# Patient Record
Sex: FEMALE
[De-identification: names, ages, dates, MRNs, and addresses within clinical notes are randomized per-mention and may not be internally consistent; named-entity substitution may affect disease eponyms.]

---

## 2018-09-26 ENCOUNTER — APPOINTMENT (OUTPATIENT)
Dept: UROGYNECOLOGY | Facility: CLINIC | Age: 53
End: 2018-09-26

## 2019-03-08 ENCOUNTER — APPOINTMENT (OUTPATIENT)
Dept: DERMATOLOGY | Facility: CLINIC | Age: 54
End: 2019-03-08

## 2019-07-02 ENCOUNTER — APPOINTMENT (OUTPATIENT)
Dept: DERMATOLOGY | Facility: CLINIC | Age: 54
End: 2019-07-02
Payer: COMMERCIAL

## 2019-07-02 VITALS — HEIGHT: 66 IN | WEIGHT: 138 LBS | BODY MASS INDEX: 22.18 KG/M2

## 2019-07-02 DIAGNOSIS — L98.8 OTHER SPECIFIED DISORDERS OF THE SKIN AND SUBCUTANEOUS TISSUE: ICD-10-CM

## 2019-07-02 DIAGNOSIS — L57.0 ACTINIC KERATOSIS: ICD-10-CM

## 2019-07-02 PROCEDURE — 99203 OFFICE O/P NEW LOW 30 MIN: CPT | Mod: 25

## 2019-07-02 PROCEDURE — 11102 TANGNTL BX SKIN SINGLE LES: CPT | Mod: 59

## 2019-07-02 PROCEDURE — 17110 DESTRUCTION B9 LES UP TO 14: CPT

## 2019-07-02 PROCEDURE — 17000 DESTRUCT PREMALG LESION: CPT | Mod: 59

## 2019-07-15 LAB — CORE LAB BIOPSY: NORMAL

## 2019-07-22 ENCOUNTER — APPOINTMENT (OUTPATIENT)
Dept: DERMATOLOGY | Facility: CLINIC | Age: 54
End: 2019-07-22
Payer: COMMERCIAL

## 2019-07-22 DIAGNOSIS — C44.619 BASAL CELL CARCINOMA OF SKIN OF LEFT UPPER LIMB, INCLUDING SHOULDER: ICD-10-CM

## 2019-07-22 DIAGNOSIS — Z85.828 PERSONAL HISTORY OF OTHER MALIGNANT NEOPLASM OF SKIN: ICD-10-CM

## 2019-07-22 PROCEDURE — 17262 DSTRJ MAL LES T/A/L 1.1-2.0: CPT

## 2019-07-26 PROBLEM — L57.0 ACTINIC KERATOSIS: Status: ACTIVE | Noted: 2019-07-26

## 2019-07-26 NOTE — ASSESSMENT
[FreeTextEntry1] : The patient was instructed to check portal and/or call the office in one week for biopsy results.\par Plan to treat by D&C if the biopsy is positive. \par \par LN2 to wart;  may need multiple txs;

## 2019-07-26 NOTE — PHYSICAL EXAM
[Full Body Skin Exam Performed] : performed [FreeTextEntry3] : Skin examination performed of the face, neck, trunk, arms, legs; \par The patient is well, alert and oriented, pleasant and cooperative.\par Eyelids, conjunctivae, oral mucosa, digits and nails all normal.  \par No cervical adenopathy.\par \par Normal findings include:\par \par Seborrheic keratoses\par Angiomas\par + lentigines and solar damage are present in sun exposed areas; \par keratotic papule with black dots on surface; thick;  R pinky tip\par \par scaling pearly erythematous patch L forearm\par \par

## 2019-07-26 NOTE — HISTORY OF PRESENT ILLNESS
[de-identified] : Pt. presents for skin check;\par No itching, bleeding, growing, changing lesions noted;\par Severity:  mild  \par Modifying factors:  none\par Associated symptoms:  none\par Context:  no association with activity \par c/o wart on finger, has had multiple cryo treatments; \par

## 2019-09-16 ENCOUNTER — APPOINTMENT (OUTPATIENT)
Dept: OBGYN | Facility: CLINIC | Age: 54
End: 2019-09-16
Payer: COMMERCIAL

## 2019-09-16 VITALS
DIASTOLIC BLOOD PRESSURE: 78 MMHG | SYSTOLIC BLOOD PRESSURE: 138 MMHG | HEIGHT: 66 IN | BODY MASS INDEX: 22.18 KG/M2 | WEIGHT: 138 LBS | RESPIRATION RATE: 16 BRPM | TEMPERATURE: 98.6 F

## 2019-09-16 PROCEDURE — 99203 OFFICE O/P NEW LOW 30 MIN: CPT | Mod: 57

## 2019-09-16 PROCEDURE — 99386 PREV VISIT NEW AGE 40-64: CPT | Mod: 25

## 2019-09-16 NOTE — PHYSICAL EXAM
[Awake] : awake [Alert] : alert [Acute Distress] : no acute distress [Mass] : no breast mass [Nipple Discharge] : no nipple discharge [Axillary LAD] : no axillary lymphadenopathy [Soft] : soft [Tender] : non tender [Oriented x3] : oriented to person, place, and time [Normal] : cervix [Cystocele] : a cystocele [Rectocele] : a rectocele [Uterine Prolapse] : uterine prolapse [No Bleeding] : there was no active vaginal bleeding [Uterine Adnexae] : were not tender and not enlarged

## 2019-09-16 NOTE — CHIEF COMPLAINT
[FreeTextEntry1] : PT FOR ANNUAL VISIT. DISCUSSED MENOPAUSE AND HOT FLASHES AND VAGINAL PRESSURE AND PROLAPSE

## 2020-01-24 ENCOUNTER — APPOINTMENT (OUTPATIENT)
Dept: DERMATOLOGY | Facility: CLINIC | Age: 55
End: 2020-01-24

## 2020-12-12 ENCOUNTER — TRANSCRIPTION ENCOUNTER (OUTPATIENT)
Age: 55
End: 2020-12-12

## 2020-12-12 ENCOUNTER — OUTPATIENT (OUTPATIENT)
Dept: OUTPATIENT SERVICES | Facility: HOSPITAL | Age: 55
LOS: 1 days | End: 2020-12-12
Payer: COMMERCIAL

## 2020-12-12 DIAGNOSIS — Z11.59 ENCOUNTER FOR SCREENING FOR OTHER VIRAL DISEASES: ICD-10-CM

## 2020-12-12 PROCEDURE — U0003: CPT

## 2020-12-13 DIAGNOSIS — Z11.59 ENCOUNTER FOR SCREENING FOR OTHER VIRAL DISEASES: ICD-10-CM

## 2020-12-13 LAB — SARS-COV-2 RNA SPEC QL NAA+PROBE: SIGNIFICANT CHANGE UP

## 2020-12-26 ENCOUNTER — OUTPATIENT (OUTPATIENT)
Dept: OUTPATIENT SERVICES | Facility: HOSPITAL | Age: 55
LOS: 1 days | End: 2020-12-26
Payer: COMMERCIAL

## 2020-12-26 DIAGNOSIS — Z20.828 CONTACT WITH AND (SUSPECTED) EXPOSURE TO OTHER VIRAL COMMUNICABLE DISEASES: ICD-10-CM

## 2020-12-26 PROCEDURE — C9803: CPT

## 2020-12-26 PROCEDURE — U0003: CPT

## 2020-12-27 DIAGNOSIS — Z20.828 CONTACT WITH AND (SUSPECTED) EXPOSURE TO OTHER VIRAL COMMUNICABLE DISEASES: ICD-10-CM

## 2020-12-27 LAB — SARS-COV-2 RNA SPEC QL NAA+PROBE: SIGNIFICANT CHANGE UP

## 2021-02-05 ENCOUNTER — APPOINTMENT (OUTPATIENT)
Dept: INTERNAL MEDICINE | Facility: CLINIC | Age: 56
End: 2021-02-05
Payer: COMMERCIAL

## 2021-02-05 ENCOUNTER — NON-APPOINTMENT (OUTPATIENT)
Age: 56
End: 2021-02-05

## 2021-02-05 VITALS
HEART RATE: 70 BPM | HEIGHT: 66 IN | TEMPERATURE: 97.1 F | RESPIRATION RATE: 16 BRPM | WEIGHT: 144 LBS | OXYGEN SATURATION: 98 % | DIASTOLIC BLOOD PRESSURE: 80 MMHG | SYSTOLIC BLOOD PRESSURE: 140 MMHG | BODY MASS INDEX: 23.14 KG/M2

## 2021-02-05 DIAGNOSIS — Q18.8 OTHER SPECIFIED CONGENITAL MALFORMATIONS OF FACE AND NECK: ICD-10-CM

## 2021-02-05 DIAGNOSIS — R92.2 INCONCLUSIVE MAMMOGRAM: ICD-10-CM

## 2021-02-05 PROCEDURE — 99072 ADDL SUPL MATRL&STAF TM PHE: CPT

## 2021-02-05 PROCEDURE — 99386 PREV VISIT NEW AGE 40-64: CPT

## 2021-02-05 NOTE — ASSESSMENT
[FreeTextEntry1] : 1.Right shoulder pain: obtain shoulder xray, c/w otc pain meds/heat therapy and may need PT vs ortho referral. \par \par 2. Hx of High TSH: on levothyroxine in the past but caused palpitations so stopped medication. no sx of hypothyroidism at this time. Check TFTs. \par \par 3.High BP: goal <130/80, Check blood pressure daily, if greater than 150/90, call MD. Keep 2 gm low sodium diet, exercise as tolerated.\par f/u in 1 month. \par \par 4. HM: Discussed fasting blood work to get.\par given referral for mammogram/breast US. Patient counseled regarding seat belt safety, distracted driving & diet and exercise.\par \par

## 2021-02-05 NOTE — HISTORY OF PRESENT ILLNESS
[FreeTextEntry1] : Patient comes in to establish care.\par  [de-identified] : Patient comes in for an annual physical exam.\par Pt is a 54 y/o F comes in to establish care, previous PCP: , Gyn: . \par She has had on/off shoulder pain b/l for many months. no injury or trauma. Feels more pain especially at night when sleepin manfred shoulders. \par Patient denies any cp, sob,abdominal pain, nausea, vomiting, palpitations, fever, chills, constipation, diarrhea.\par

## 2021-02-05 NOTE — HEALTH RISK ASSESSMENT
[0] : 2) Feeling down, depressed, or hopeless: Not at all (0) [Yes] : Yes [Monthly or less (1 pt)] : Monthly or less (1 point) [1 or 2 (0 pts)] : 1 or 2 (0 points) [Never (0 pts)] : Never (0 points) [No] : In the past 12 months have you used drugs other than those required for medical reasons? No [Patient reported mammogram was normal] : Patient reported mammogram was normal [Patient reported colonoscopy was normal] : Patient reported colonoscopy was normal [With Family] : lives with family [] :  [Fully functional (bathing, dressing, toileting, transferring, walking, feeding)] : Fully functional (bathing, dressing, toileting, transferring, walking, feeding) [Fully functional (using the telephone, shopping, preparing meals, housekeeping, doing laundry, using] : Fully functional and needs no help or supervision to perform IADLs (using the telephone, shopping, preparing meals, housekeeping, doing laundry, using transportation, managing medications and managing finances) [] : No [[id number]] : 0 [MammogramDate] : 10/19 [ColonoscopyDate] : 2021

## 2021-02-17 ENCOUNTER — APPOINTMENT (OUTPATIENT)
Dept: RADIOLOGY | Facility: CLINIC | Age: 56
End: 2021-02-17
Payer: COMMERCIAL

## 2021-02-17 ENCOUNTER — OUTPATIENT (OUTPATIENT)
Dept: OUTPATIENT SERVICES | Facility: HOSPITAL | Age: 56
LOS: 1 days | End: 2021-02-17
Payer: COMMERCIAL

## 2021-02-17 DIAGNOSIS — M25.511 PAIN IN RIGHT SHOULDER: ICD-10-CM

## 2021-02-17 PROCEDURE — 73030 X-RAY EXAM OF SHOULDER: CPT

## 2021-02-17 PROCEDURE — 73030 X-RAY EXAM OF SHOULDER: CPT | Mod: 26,RT

## 2021-02-21 ENCOUNTER — OUTPATIENT (OUTPATIENT)
Dept: OUTPATIENT SERVICES | Facility: HOSPITAL | Age: 56
LOS: 1 days | End: 2021-02-21
Payer: COMMERCIAL

## 2021-02-21 DIAGNOSIS — Z20.828 CONTACT WITH AND (SUSPECTED) EXPOSURE TO OTHER VIRAL COMMUNICABLE DISEASES: ICD-10-CM

## 2021-02-21 LAB — SARS-COV-2 RNA SPEC QL NAA+PROBE: SIGNIFICANT CHANGE UP

## 2021-02-21 PROCEDURE — C9803: CPT

## 2021-02-21 PROCEDURE — U0005: CPT

## 2021-02-21 PROCEDURE — U0003: CPT

## 2021-02-22 DIAGNOSIS — Z20.828 CONTACT WITH AND (SUSPECTED) EXPOSURE TO OTHER VIRAL COMMUNICABLE DISEASES: ICD-10-CM

## 2021-02-22 LAB
25(OH)D3 SERPL-MCNC: 23.1 NG/ML
ALBUMIN SERPL ELPH-MCNC: 4.6 G/DL
ALP BLD-CCNC: 96 U/L
ALT SERPL-CCNC: 14 U/L
ANION GAP SERPL CALC-SCNC: 12 MMOL/L
AST SERPL-CCNC: 20 U/L
BASOPHILS # BLD AUTO: 0.04 K/UL
BASOPHILS NFR BLD AUTO: 0.7 %
BILIRUB SERPL-MCNC: 0.3 MG/DL
BUN SERPL-MCNC: 13 MG/DL
CALCIUM SERPL-MCNC: 10 MG/DL
CHLORIDE SERPL-SCNC: 103 MMOL/L
CHOLEST SERPL-MCNC: 220 MG/DL
CO2 SERPL-SCNC: 26 MMOL/L
CREAT SERPL-MCNC: 0.84 MG/DL
EOSINOPHIL # BLD AUTO: 0.28 K/UL
EOSINOPHIL NFR BLD AUTO: 5.2 %
ESTIMATED AVERAGE GLUCOSE: 114 MG/DL
GLUCOSE SERPL-MCNC: 88 MG/DL
HBA1C MFR BLD HPLC: 5.6 %
HCT VFR BLD CALC: 44.4 %
HDLC SERPL-MCNC: 60 MG/DL
HGB BLD-MCNC: 13.9 G/DL
IMM GRANULOCYTES NFR BLD AUTO: 0.4 %
LDLC SERPL CALC-MCNC: 136 MG/DL
LYMPHOCYTES # BLD AUTO: 1.69 K/UL
LYMPHOCYTES NFR BLD AUTO: 31.5 %
MAN DIFF?: NORMAL
MCHC RBC-ENTMCNC: 27.6 PG
MCHC RBC-ENTMCNC: 31.3 GM/DL
MCV RBC AUTO: 88.1 FL
MONOCYTES # BLD AUTO: 0.38 K/UL
MONOCYTES NFR BLD AUTO: 7.1 %
NEUTROPHILS # BLD AUTO: 2.96 K/UL
NEUTROPHILS NFR BLD AUTO: 55.1 %
NONHDLC SERPL-MCNC: 159 MG/DL
PLATELET # BLD AUTO: 221 K/UL
POTASSIUM SERPL-SCNC: 4.3 MMOL/L
PROT SERPL-MCNC: 7.1 G/DL
RBC # BLD: 5.04 M/UL
RBC # FLD: 14 %
SARS-COV-2 IGG SERPL IA-ACNC: 0.06 INDEX
SARS-COV-2 IGG SERPL QL IA: NEGATIVE
SODIUM SERPL-SCNC: 142 MMOL/L
T4 FREE SERPL-MCNC: 0.8 NG/DL
TRIGL SERPL-MCNC: 118 MG/DL
TSH SERPL-ACNC: 8.34 UIU/ML
VIT B12 SERPL-MCNC: 543 PG/ML
WBC # FLD AUTO: 5.37 K/UL

## 2021-02-23 ENCOUNTER — NON-APPOINTMENT (OUTPATIENT)
Age: 56
End: 2021-02-23

## 2021-03-02 ENCOUNTER — RESULT REVIEW (OUTPATIENT)
Age: 56
End: 2021-03-02

## 2021-03-02 ENCOUNTER — APPOINTMENT (OUTPATIENT)
Dept: MAMMOGRAPHY | Facility: CLINIC | Age: 56
End: 2021-03-02
Payer: COMMERCIAL

## 2021-03-02 ENCOUNTER — OUTPATIENT (OUTPATIENT)
Dept: OUTPATIENT SERVICES | Facility: HOSPITAL | Age: 56
LOS: 1 days | End: 2021-03-02
Payer: COMMERCIAL

## 2021-03-02 ENCOUNTER — APPOINTMENT (OUTPATIENT)
Dept: ULTRASOUND IMAGING | Facility: CLINIC | Age: 56
End: 2021-03-02
Payer: COMMERCIAL

## 2021-03-02 DIAGNOSIS — Z00.8 ENCOUNTER FOR OTHER GENERAL EXAMINATION: ICD-10-CM

## 2021-03-02 DIAGNOSIS — R92.2 INCONCLUSIVE MAMMOGRAM: ICD-10-CM

## 2021-03-02 PROCEDURE — 76641 ULTRASOUND BREAST COMPLETE: CPT

## 2021-03-02 PROCEDURE — 77063 BREAST TOMOSYNTHESIS BI: CPT | Mod: 26

## 2021-03-02 PROCEDURE — 77067 SCR MAMMO BI INCL CAD: CPT

## 2021-03-02 PROCEDURE — 76641 ULTRASOUND BREAST COMPLETE: CPT | Mod: 26,50

## 2021-03-02 PROCEDURE — 77063 BREAST TOMOSYNTHESIS BI: CPT

## 2021-03-02 PROCEDURE — 77067 SCR MAMMO BI INCL CAD: CPT | Mod: 26

## 2021-03-03 ENCOUNTER — NON-APPOINTMENT (OUTPATIENT)
Age: 56
End: 2021-03-03

## 2021-03-09 ENCOUNTER — APPOINTMENT (OUTPATIENT)
Dept: INTERNAL MEDICINE | Facility: CLINIC | Age: 56
End: 2021-03-09

## 2021-05-03 ENCOUNTER — LABORATORY RESULT (OUTPATIENT)
Age: 56
End: 2021-05-03

## 2021-05-10 LAB — TSH SERPL-ACNC: 4.82 UIU/ML

## 2021-05-17 ENCOUNTER — APPOINTMENT (OUTPATIENT)
Dept: OBGYN | Facility: CLINIC | Age: 56
End: 2021-05-17

## 2021-05-24 ENCOUNTER — NON-APPOINTMENT (OUTPATIENT)
Age: 56
End: 2021-05-24

## 2021-05-24 ENCOUNTER — APPOINTMENT (OUTPATIENT)
Dept: OBGYN | Facility: CLINIC | Age: 56
End: 2021-05-24
Payer: COMMERCIAL

## 2021-05-24 VITALS
DIASTOLIC BLOOD PRESSURE: 60 MMHG | HEART RATE: 78 BPM | SYSTOLIC BLOOD PRESSURE: 139 MMHG | TEMPERATURE: 97.6 F | RESPIRATION RATE: 16 BRPM | BODY MASS INDEX: 23.3 KG/M2 | HEIGHT: 66 IN | WEIGHT: 145 LBS

## 2021-05-24 DIAGNOSIS — Z01.419 ENCOUNTER FOR GYNECOLOGICAL EXAMINATION (GENERAL) (ROUTINE) W/OUT ABNORMAL FINDINGS: ICD-10-CM

## 2021-05-24 PROCEDURE — 99396 PREV VISIT EST AGE 40-64: CPT

## 2021-05-24 PROCEDURE — 82270 OCCULT BLOOD FECES: CPT

## 2021-05-24 NOTE — PLAN
[FreeTextEntry1] : PT PRESENTS FOR ANNUAL VISIT. DISCUSSED PROLAPSE. DISCUSSED OPTIONS. INFORMATION GIVEN. DISCUSSED SURGICAL OPTIONS. MAMMO ORDERED. PAP ORDERED

## 2021-05-24 NOTE — PHYSICAL EXAM
[Appropriately responsive] : appropriately responsive [Alert] : alert [No Acute Distress] : no acute distress [No Lymphadenopathy] : no lymphadenopathy [Regular Rate Rhythm] : regular rate rhythm [No Murmurs] : no murmurs [Clear to Auscultation B/L] : clear to auscultation bilaterally [Soft] : soft [Non-tender] : non-tender [Non-distended] : non-distended [No HSM] : No HSM [No Lesions] : no lesions [No Mass] : no mass [Oriented x3] : oriented x3 [Examination Of The Breasts] : a normal appearance [No Masses] : no breast masses were palpable [Cystocele] : a cystocele [Uterine Prolapse] : uterine prolapse [Normal rectal exam] : was normal [Normal Brown Stool] : was normal and brown [Occult Blood Positive] : was negative for occult blood analysis [Internal Hemorrhoid] : no internal hemorrhoids were present [External Hemorrhoid] : no external hemorrhoids were present [Skin Tags] : no residual hemorrhoidal skin tags [Normal] : was normal [None] : there was no rectal mass

## 2021-05-24 NOTE — COUNSELING
[Nutrition/ Exercise/ Weight Management] : nutrition, exercise, weight management [Body Image] : body image [Breast Self Exam] : breast self exam [Vaccines] : vaccines [FreeTextEntry2] : UTEROVAGINAL PROLAPSE

## 2021-08-09 ENCOUNTER — APPOINTMENT (OUTPATIENT)
Dept: DERMATOLOGY | Facility: CLINIC | Age: 56
End: 2021-08-09
Payer: COMMERCIAL

## 2021-08-09 PROCEDURE — 17110 DESTRUCTION B9 LES UP TO 14: CPT

## 2021-08-09 PROCEDURE — 99213 OFFICE O/P EST LOW 20 MIN: CPT | Mod: 25

## 2021-08-09 NOTE — ASSESSMENT
[FreeTextEntry1] : 1) benign findings as above- education\par \par 2) Verruca vulgaris-\par -Education\par -paired using 15 blade followed by LN2 x2 cycles; SED, undertstands risk of redness, blistering, scarring, and incomplete treatment\par

## 2021-08-09 NOTE — PHYSICAL EXAM
[FreeTextEntry3] : AAOx3, pleasant, NAD, no visual lymphadenopathy\par hair, scalp, face, nose, eyelids, ears, lips, oropharynx, neck, chest, abdomen, back, right arm, left arm, nails, and hands examined with all normal findings,\par pertinent findings include:\par \par lentigines\par keratotic papule with black dots on surface on right 5th digit

## 2021-08-09 NOTE — HISTORY OF PRESENT ILLNESS
[FreeTextEntry1] : spot [de-identified] : 55 year old female with spot on right 5th digit. hx of improvement.

## 2021-08-12 ENCOUNTER — NON-APPOINTMENT (OUTPATIENT)
Age: 56
End: 2021-08-12

## 2021-08-12 LAB — TSH SERPL-ACNC: 4.1 UIU/ML

## 2021-09-14 ENCOUNTER — APPOINTMENT (OUTPATIENT)
Dept: INTERNAL MEDICINE | Facility: CLINIC | Age: 56
End: 2021-09-14

## 2021-09-14 ENCOUNTER — APPOINTMENT (OUTPATIENT)
Dept: INTERNAL MEDICINE | Facility: CLINIC | Age: 56
End: 2021-09-14
Payer: COMMERCIAL

## 2021-09-14 VITALS
WEIGHT: 138 LBS | BODY MASS INDEX: 22.18 KG/M2 | SYSTOLIC BLOOD PRESSURE: 124 MMHG | DIASTOLIC BLOOD PRESSURE: 76 MMHG | HEART RATE: 68 BPM | HEIGHT: 66 IN | TEMPERATURE: 98.6 F | OXYGEN SATURATION: 99 %

## 2021-09-14 DIAGNOSIS — R21 RASH AND OTHER NONSPECIFIC SKIN ERUPTION: ICD-10-CM

## 2021-09-14 PROCEDURE — 99214 OFFICE O/P EST MOD 30 MIN: CPT

## 2021-09-14 NOTE — ASSESSMENT
[FreeTextEntry1] : 1.thigh rash: Likely fungal. Discussed using betamethasone clotrimazole to affected area twice daily. Discussed preventative measures. Next\par \par 2.right shoulder pain: Given referral to physical therapy prior to doing MRI.\par \par 3.dysphagia: Given referral to GI. She may need upper endoscopy.\par \par 4.hypothyroidism: Given referral to new endocrinologist, follow up with endocrine on thyroid ultrasound.

## 2021-09-14 NOTE — HISTORY OF PRESENT ILLNESS
[FreeTextEntry8] : Ms. DINA HOUSER is a 55 year F who comes in for an acute visit.\par Pt c/o rash on inner right thigh for the past few months and some of left thigh. The rash is red, itchy and aggravated with heat. She tx with coconut oil, Neosporin, alcohol wipe, otc HC and calamine lotion without much relief. \par Pt also notes continued right shoulder pain, she did have negative Xray in feb 2021 and did not end up doing PT due to covid. She notes its worse in the morning with stiffness. She has not had an MRI in the past. \par She also notes on off difficulty swallowing certain foods-with stuck sensation with meat, dry food but not with liquids. She did see endocrine yesterday and she will get a thyroid US. \par Patient denies any cp, sob,abdominal pain, nausea, vomiting, palpitations, fever, chills, constipation, diarrhea.\par

## 2021-11-11 ENCOUNTER — APPOINTMENT (OUTPATIENT)
Dept: GASTROENTEROLOGY | Facility: CLINIC | Age: 56
End: 2021-11-11
Payer: COMMERCIAL

## 2021-11-11 VITALS
BODY MASS INDEX: 22.34 KG/M2 | WEIGHT: 139 LBS | SYSTOLIC BLOOD PRESSURE: 120 MMHG | DIASTOLIC BLOOD PRESSURE: 70 MMHG | HEIGHT: 66 IN | HEART RATE: 95 BPM

## 2021-11-11 PROCEDURE — 99203 OFFICE O/P NEW LOW 30 MIN: CPT

## 2021-11-11 NOTE — ASSESSMENT
[FreeTextEntry1] : 55 yo female with history of dysphagia and ?Schatski's ring. Will arrange upper endoscopy and possible dilation.

## 2021-11-11 NOTE — HISTORY OF PRESENT ILLNESS
[de-identified] : Ms. DINA HOUSER is a 56 year old female with history of intermittent dysphagia for solids. There has been no weight loss or progressive symptoms. There are no complaints of heartburn. Symptoms have lasted for several years. There is no family history of stomach or esophageal cancer. No complaints of bowel issues.\par

## 2022-01-04 ENCOUNTER — APPOINTMENT (OUTPATIENT)
Dept: DERMATOLOGY | Facility: CLINIC | Age: 57
End: 2022-01-04
Payer: COMMERCIAL

## 2022-01-04 DIAGNOSIS — Z87.2 PERSONAL HISTORY OF DISEASES OF THE SKIN AND SUBCUTANEOUS TISSUE: ICD-10-CM

## 2022-01-04 DIAGNOSIS — L82.0 INFLAMED SEBORRHEIC KERATOSIS: ICD-10-CM

## 2022-01-04 DIAGNOSIS — L30.4 ERYTHEMA INTERTRIGO: ICD-10-CM

## 2022-01-04 DIAGNOSIS — L57.0 ACTINIC KERATOSIS: ICD-10-CM

## 2022-01-04 DIAGNOSIS — L81.4 OTHER MELANIN HYPERPIGMENTATION: ICD-10-CM

## 2022-01-04 DIAGNOSIS — D22.9 MELANOCYTIC NEVI, UNSPECIFIED: ICD-10-CM

## 2022-01-04 DIAGNOSIS — Z12.83 ENCOUNTER FOR SCREENING FOR MALIGNANT NEOPLASM OF SKIN: ICD-10-CM

## 2022-01-04 PROCEDURE — 99214 OFFICE O/P EST MOD 30 MIN: CPT | Mod: 25

## 2022-01-04 PROCEDURE — 17000 DESTRUCT PREMALG LESION: CPT | Mod: 59

## 2022-01-04 PROCEDURE — 17110 DESTRUCTION B9 LES UP TO 14: CPT | Mod: 59

## 2022-01-04 NOTE — ASSESSMENT
[FreeTextEntry1] : 1) benign findings as above- education\par \par 2) intertrigo\par education\par keep area dry\par keto cream PRN; SED\par \par 3) Actinic keratoses as above\par -Treated with cryotherapy x 2, side effects discussed including erythema and scarring, blister formation expected by patient, total freeze time 4 seconds. Wound care reviewed withpatient. Risk of hypo/hyperpigmentation reviewed with patient, and possible need for re-treatment and / or future biopsy also reviewed with patient\par - total # treated- 1\par \par 4) ISK\par The specified lesions were treated with liquid nitrogen cryotherapy.  Discussed risks including pain, blistering, crusting, discoloration, recurrence.\par

## 2022-01-04 NOTE — HISTORY OF PRESENT ILLNESS
[FreeTextEntry1] : skin check, rash [de-identified] : 56 year old female here for skin check. rash on thighs. \par growth on right cheek.

## 2022-01-04 NOTE — PHYSICAL EXAM
[FreeTextEntry3] : AAOx3, pleasant, NAD, no visual lymphadenopathy\par hair, scalp, face, nose, eyelids, ears, lips, oropharynx, neck, chest, abdomen, back, right arm, left arm, nails, and hands examined with all normal findings,\par pertinent findings include:\par \par multiple benign nevi and lentigines\par scaling erythematous papule on right cheek\par + inflamed, waxy, keratotic papule on mid back\par erythema on groin

## 2022-01-13 ENCOUNTER — TRANSCRIPTION ENCOUNTER (OUTPATIENT)
Age: 57
End: 2022-01-13

## 2022-01-21 ENCOUNTER — APPOINTMENT (OUTPATIENT)
Dept: DISASTER EMERGENCY | Facility: CLINIC | Age: 57
End: 2022-01-21

## 2022-01-21 ENCOUNTER — LABORATORY RESULT (OUTPATIENT)
Age: 57
End: 2022-01-21

## 2022-01-24 ENCOUNTER — TRANSCRIPTION ENCOUNTER (OUTPATIENT)
Age: 57
End: 2022-01-24

## 2022-01-25 ENCOUNTER — APPOINTMENT (OUTPATIENT)
Dept: GASTROENTEROLOGY | Facility: AMBULATORY MEDICAL SERVICES | Age: 57
End: 2022-01-25

## 2022-01-27 ENCOUNTER — RX RENEWAL (OUTPATIENT)
Age: 57
End: 2022-01-27

## 2022-03-16 ENCOUNTER — APPOINTMENT (OUTPATIENT)
Dept: INTERNAL MEDICINE | Facility: CLINIC | Age: 57
End: 2022-03-16

## 2022-05-03 ENCOUNTER — APPOINTMENT (OUTPATIENT)
Dept: GASTROENTEROLOGY | Facility: CLINIC | Age: 57
End: 2022-05-03
Payer: COMMERCIAL

## 2022-05-03 VITALS
DIASTOLIC BLOOD PRESSURE: 89 MMHG | HEIGHT: 66 IN | SYSTOLIC BLOOD PRESSURE: 152 MMHG | WEIGHT: 139 LBS | BODY MASS INDEX: 22.34 KG/M2 | HEART RATE: 70 BPM

## 2022-05-03 PROCEDURE — 99214 OFFICE O/P EST MOD 30 MIN: CPT

## 2022-05-03 NOTE — PHYSICAL EXAM
[General Appearance - Alert] : alert [General Appearance - In No Acute Distress] : in no acute distress [Sclera] : the sclera and conjunctiva were normal [PERRL With Normal Accommodation] : pupils were equal in size, round, and reactive to light [Extraocular Movements] : extraocular movements were intact [Outer Ear] : the ears and nose were normal in appearance [Oropharynx] : the oropharynx was normal [Neck Appearance] : the appearance of the neck was normal [Neck Cervical Mass (___cm)] : no neck mass was observed [Jugular Venous Distention Increased] : there was no jugular-venous distention [Thyroid Diffuse Enlargement] : the thyroid was not enlarged [Thyroid Nodule] : there were no palpable thyroid nodules [Auscultation Breath Sounds / Voice Sounds] : lungs were clear to auscultation bilaterally [Abnormal Walk] : normal gait [Nail Clubbing] : no clubbing  or cyanosis of the fingernails [Musculoskeletal - Swelling] : no joint swelling seen [Motor Tone] : muscle strength and tone were normal [Skin Color & Pigmentation] : normal skin color and pigmentation [Skin Turgor] : normal skin turgor [] : no rash

## 2022-05-03 NOTE — ASSESSMENT
[FreeTextEntry1] : Dysphagia will stop eating post a bedtime and schedule upper endoscopy\par Schedule screening colonoscopy with supra-

## 2022-05-03 NOTE — HISTORY OF PRESENT ILLNESS
[FreeTextEntry1] : It has been having long-standing difficulties. Several years worth of dysphagia to solids greater than liquids. She denies heartburn, but does have a snack close to that time almost every night. She denies melena. She denies significant nonsteroidal anti-inflammatory drug use or alcohol use. She denies any family history of Robertson's esophagus. She denies any asthma or allergic history she has never had a screening colonoscopy

## 2022-06-06 ENCOUNTER — APPOINTMENT (OUTPATIENT)
Dept: DERMATOLOGY | Facility: CLINIC | Age: 57
End: 2022-06-06
Payer: COMMERCIAL

## 2022-06-06 DIAGNOSIS — D48.9 NEOPLASM OF UNCERTAIN BEHAVIOR, UNSPECIFIED: ICD-10-CM

## 2022-06-06 PROCEDURE — 11102 TANGNTL BX SKIN SINGLE LES: CPT

## 2022-06-06 PROCEDURE — 99213 OFFICE O/P EST LOW 20 MIN: CPT | Mod: 25

## 2022-06-06 RX ORDER — SODIUM SULFATE, POTASSIUM SULFATE, MAGNESIUM SULFATE 17.5; 3.13; 1.6 G/ML; G/ML; G/ML
17.5-3.13-1.6 SOLUTION, CONCENTRATE ORAL
Qty: 1 | Refills: 0 | Status: DISCONTINUED | COMMUNITY
Start: 2022-05-03 | End: 2022-06-06

## 2022-06-06 NOTE — ASSESSMENT
[FreeTextEntry1] : Pt. with traumatic nail dystrophy 2' nail care; \par ? onycho beginning on R thumb; \par \par Therapeutic options and their risks and benefits; along with multiple diagnostic possibilities were discussed at length; risks and benefits of further study were discussed;\par \par nail clipped, sent for PAS:\par t/c terbinafine if + - otherwise should regrow normally; \par Continue regular exams; \par has wart on finger, t/c repeat cryo

## 2022-06-06 NOTE — HISTORY OF PRESENT ILLNESS
[de-identified] : Pt. c/o problems with nails; \par had gel manicure, removed, now with detached thin nails

## 2022-06-13 ENCOUNTER — APPOINTMENT (OUTPATIENT)
Dept: OBGYN | Facility: CLINIC | Age: 57
End: 2022-06-13
Payer: COMMERCIAL

## 2022-06-13 VITALS
BODY MASS INDEX: 22.11 KG/M2 | WEIGHT: 137 LBS | TEMPERATURE: 98 F | SYSTOLIC BLOOD PRESSURE: 140 MMHG | DIASTOLIC BLOOD PRESSURE: 82 MMHG

## 2022-06-13 DIAGNOSIS — Z12.39 ENCOUNTER FOR OTHER SCREENING FOR MALIGNANT NEOPLASM OF BREAST: ICD-10-CM

## 2022-06-13 DIAGNOSIS — Z01.419 ENCOUNTER FOR GYNECOLOGICAL EXAMINATION (GENERAL) (ROUTINE) W/OUT ABNORMAL FINDINGS: ICD-10-CM

## 2022-06-13 DIAGNOSIS — N81.2 INCOMPLETE UTEROVAGINAL PROLAPSE: ICD-10-CM

## 2022-06-13 PROCEDURE — 82270 OCCULT BLOOD FECES: CPT

## 2022-06-13 PROCEDURE — 99396 PREV VISIT EST AGE 40-64: CPT

## 2022-06-13 NOTE — PLAN
[FreeTextEntry1] : 57 YO FEMALE PRESENTS FOR ANNUAL GYN EXAMINATION. SELF BREAST EXAMINATION TAUGHT. MAMMOGRAM ORDERED. EXERCISE, CALCIUM AND VITAMIN D3 SUPPLEMENTATION DISCUSSED. BONE DENSITY STUDY AND INDICATIONS REVIEWED. COLONOSCOPY HISTORY REVIEWED AND DISCUSSED FOLLOWUP. DISCUSSED PROLAPSE AND SURGERY

## 2022-06-13 NOTE — COUNSELING
[Breast Self Exam] : breast self exam [Bladder Hygiene] : bladder hygiene [Vaccines] : vaccines [Influenza Vaccine] : influenza vaccine [FreeTextEntry2] : PROLAPSE

## 2022-06-15 ENCOUNTER — LABORATORY RESULT (OUTPATIENT)
Age: 57
End: 2022-06-15

## 2022-06-15 LAB — CYTOLOGY CVX/VAG DOC THIN PREP: ABNORMAL

## 2022-06-21 ENCOUNTER — APPOINTMENT (OUTPATIENT)
Dept: INTERNAL MEDICINE | Facility: CLINIC | Age: 57
End: 2022-06-21
Payer: COMMERCIAL

## 2022-06-21 VITALS
OXYGEN SATURATION: 98 % | BODY MASS INDEX: 21.86 KG/M2 | HEIGHT: 66 IN | WEIGHT: 136 LBS | DIASTOLIC BLOOD PRESSURE: 60 MMHG | HEART RATE: 65 BPM | TEMPERATURE: 98.2 F | SYSTOLIC BLOOD PRESSURE: 118 MMHG

## 2022-06-21 DIAGNOSIS — Z23 ENCOUNTER FOR IMMUNIZATION: ICD-10-CM

## 2022-06-21 DIAGNOSIS — M25.511 PAIN IN RIGHT SHOULDER: ICD-10-CM

## 2022-06-21 DIAGNOSIS — R79.89 OTHER SPECIFIED ABNORMAL FINDINGS OF BLOOD CHEMISTRY: ICD-10-CM

## 2022-06-21 LAB
ALBUMIN SERPL ELPH-MCNC: 4.6 G/DL
ALP BLD-CCNC: 82 U/L
ALT SERPL-CCNC: 11 U/L
ANION GAP SERPL CALC-SCNC: 17 MMOL/L
AST SERPL-CCNC: 19 U/L
BASOPHILS # BLD AUTO: 0.04 K/UL
BASOPHILS NFR BLD AUTO: 0.8 %
BILIRUB SERPL-MCNC: 0.3 MG/DL
BUN SERPL-MCNC: 12 MG/DL
CALCIUM SERPL-MCNC: 9.3 MG/DL
CHLORIDE SERPL-SCNC: 104 MMOL/L
CHOLEST SERPL-MCNC: 204 MG/DL
CO2 SERPL-SCNC: 22 MMOL/L
CREAT SERPL-MCNC: 0.74 MG/DL
EGFR: 95 ML/MIN/1.73M2
EOSINOPHIL # BLD AUTO: 0.18 K/UL
EOSINOPHIL NFR BLD AUTO: 3.8 %
GLUCOSE SERPL-MCNC: 82 MG/DL
HCT VFR BLD CALC: 41.4 %
HDLC SERPL-MCNC: 53 MG/DL
HGB BLD-MCNC: 13.2 G/DL
IMM GRANULOCYTES NFR BLD AUTO: 0.2 %
LDLC SERPL CALC-MCNC: 135 MG/DL
LYMPHOCYTES # BLD AUTO: 1.46 K/UL
LYMPHOCYTES NFR BLD AUTO: 30.9 %
MAN DIFF?: NORMAL
MCHC RBC-ENTMCNC: 28.2 PG
MCHC RBC-ENTMCNC: 31.9 GM/DL
MCV RBC AUTO: 88.5 FL
MONOCYTES # BLD AUTO: 0.36 K/UL
MONOCYTES NFR BLD AUTO: 7.6 %
NEUTROPHILS # BLD AUTO: 2.67 K/UL
NEUTROPHILS NFR BLD AUTO: 56.7 %
NONHDLC SERPL-MCNC: 151 MG/DL
PLATELET # BLD AUTO: 190 K/UL
POTASSIUM SERPL-SCNC: 4.1 MMOL/L
PROT SERPL-MCNC: 6.9 G/DL
RBC # BLD: 4.68 M/UL
RBC # FLD: 13.7 %
SODIUM SERPL-SCNC: 143 MMOL/L
TRIGL SERPL-MCNC: 82 MG/DL
TSH SERPL-ACNC: 5.65 UIU/ML
WBC # FLD AUTO: 4.72 K/UL

## 2022-06-21 PROCEDURE — 99213 OFFICE O/P EST LOW 20 MIN: CPT

## 2022-06-21 RX ORDER — CLOTRIMAZOLE AND BETAMETHASONE DIPROPIONATE 10; .5 MG/G; MG/G
1-0.05 CREAM TOPICAL TWICE DAILY
Qty: 1 | Refills: 1 | Status: DISCONTINUED | COMMUNITY
Start: 2021-09-14 | End: 2022-06-21

## 2022-06-22 ENCOUNTER — RESULT REVIEW (OUTPATIENT)
Age: 57
End: 2022-06-22

## 2022-06-22 ENCOUNTER — APPOINTMENT (OUTPATIENT)
Dept: ULTRASOUND IMAGING | Facility: CLINIC | Age: 57
End: 2022-06-22
Payer: COMMERCIAL

## 2022-06-22 ENCOUNTER — APPOINTMENT (OUTPATIENT)
Dept: MAMMOGRAPHY | Facility: CLINIC | Age: 57
End: 2022-06-22
Payer: COMMERCIAL

## 2022-06-22 ENCOUNTER — OUTPATIENT (OUTPATIENT)
Dept: OUTPATIENT SERVICES | Facility: HOSPITAL | Age: 57
LOS: 1 days | End: 2022-06-22
Payer: COMMERCIAL

## 2022-06-22 DIAGNOSIS — Z12.39 ENCOUNTER FOR OTHER SCREENING FOR MALIGNANT NEOPLASM OF BREAST: ICD-10-CM

## 2022-06-22 DIAGNOSIS — Z01.419 ENCOUNTER FOR GYNECOLOGICAL EXAMINATION (GENERAL) (ROUTINE) WITHOUT ABNORMAL FINDINGS: ICD-10-CM

## 2022-06-22 DIAGNOSIS — Z00.8 ENCOUNTER FOR OTHER GENERAL EXAMINATION: ICD-10-CM

## 2022-06-22 DIAGNOSIS — R92.2 INCONCLUSIVE MAMMOGRAM: ICD-10-CM

## 2022-06-22 PROCEDURE — 77067 SCR MAMMO BI INCL CAD: CPT | Mod: 26

## 2022-06-22 PROCEDURE — 77067 SCR MAMMO BI INCL CAD: CPT

## 2022-06-22 PROCEDURE — 77063 BREAST TOMOSYNTHESIS BI: CPT

## 2022-06-22 PROCEDURE — 76641 ULTRASOUND BREAST COMPLETE: CPT | Mod: 26,50

## 2022-06-22 PROCEDURE — 77063 BREAST TOMOSYNTHESIS BI: CPT | Mod: 26

## 2022-06-22 PROCEDURE — 76641 ULTRASOUND BREAST COMPLETE: CPT

## 2022-06-22 NOTE — ASSESSMENT
[FreeTextEntry1] : 1.Hypothyroidism: recheck TFT's in 6-8 weeks, if mildly high then will go up on thyroid dosing in small increments as pt has had SE of palpitations in the past. Records from  Endocrine viewed on pt's phone, obtain records. repeat thyoid us in 9/22.

## 2022-06-22 NOTE — HISTORY OF PRESENT ILLNESS
[FreeTextEntry1] : FU [de-identified] : DINA HOUSER is a 56 year F who is being seen today in follow up to discuss blood work results.\par BW doing well but TSH is mildly elevated today and pt notes she is compliant with her medications. \par She did see [facility] Endocrine Dr.Rachel Mi in 9/2021 and had thyroid US done which showed right nodule 0.3 x 0.3 x 0.3 cm nodule and left sided nodule, normal size thyroid with no suspicious nodules. She was advised to have TPO and Thyroglobulin Ab done. \par She denies any sx of weight gain, constipation, dry skin/hair with mild fatigue. \par Patient denies any cp, sob,abdominal pain, nausea, vomiting, palpitations, fever, chills, constipation, diarrhea.\par

## 2022-06-23 ENCOUNTER — TRANSCRIPTION ENCOUNTER (OUTPATIENT)
Age: 57
End: 2022-06-23

## 2022-07-12 ENCOUNTER — APPOINTMENT (OUTPATIENT)
Dept: ORTHOPEDIC SURGERY | Facility: CLINIC | Age: 57
End: 2022-07-12

## 2022-07-12 ENCOUNTER — NON-APPOINTMENT (OUTPATIENT)
Age: 57
End: 2022-07-12

## 2022-07-12 DIAGNOSIS — M75.81 OTHER SHOULDER LESIONS, RIGHT SHOULDER: ICD-10-CM

## 2022-07-12 DIAGNOSIS — M75.41 IMPINGEMENT SYNDROME OF RIGHT SHOULDER: ICD-10-CM

## 2022-07-12 DIAGNOSIS — M75.42 IMPINGEMENT SYNDROME OF RIGHT SHOULDER: ICD-10-CM

## 2022-07-12 DIAGNOSIS — M75.80 OTHER SHOULDER LESIONS, UNSPECIFIED SHOULDER: ICD-10-CM

## 2022-07-12 DIAGNOSIS — M75.20 BICIPITAL TENDINITIS, UNSPECIFIED SHOULDER: ICD-10-CM

## 2022-07-12 PROCEDURE — 99204 OFFICE O/P NEW MOD 45 MIN: CPT

## 2022-07-12 PROCEDURE — 73030 X-RAY EXAM OF SHOULDER: CPT | Mod: RT

## 2022-07-12 NOTE — HISTORY OF PRESENT ILLNESS
[Worsening] : worsening [___ yrs] : [unfilled] year(s) ago [3] : an average pain level of 3/10 [0] : a minimum pain level of 0/10 [4] : a maximum pain level of 4/10 [Lifting] : worsened by lifting [de-identified] : DINA HOUSER is a 56 year female being seen for initial visit R shoulder pain. 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 She reports worst pain at night, and finds it difficult to get comfortable. She states she has minimal pain during the day. She endorses general joint pain, she cannot pinpoint an exact location. Patient denies numbness and tingling to the extremities. She denies use of OTC pain medications. She has not done any formal PT. She denies prior injuries.

## 2022-07-12 NOTE — ADDENDUM
[FreeTextEntry1] : Documented by Stanislav Olson acting as a scribe for Dr. Viramontes and Christian Warren PA-C on 07/12/2022. \par \par All medical record entries made by the Scribe were at my, Dr. Viramontes, and Christian Warren's, direction and\par personally dictated by me on 07/12/2022. I have reviewed the chart and agree that the record\par accurately reflects my personal performance of the history, physical exam, procedure and imaging.

## 2022-07-12 NOTE — DISCUSSION/SUMMARY
[de-identified] : DINA HOUSER is a 56 year female being seen for initial visit R shoulder pain. 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 She reports worst pain at night, and finds it difficult to get comfortable. She states she has minimal pain during the day. She endorses general joint pain, she cannot pinpoint an exact location. Patient denies numbness and tingling to the extremities. She denies use of OTC pain medications. She has not done any formal PT. She denies prior injuries.\par \par We had a thorough discussion regarding the nature of her pain, the pathophysiology, as well as all treatment options. After review of pts normal radiographs and her clinical exam I believe her primary complaint to be RTC tendonitis of which conservative measures are indicated. A prescription of Meloxicam x 21 days was prescribed and should be taken as directed with food to prevent GI upset, if occurs pt to D/C and call us at that time. Pt to perform PT as well 2x/week x 6-8 weeks and until we see her again. At that time we will see her for clinical reassessment. If she is pain free at that time she will see us in the future on an prn basis. If she is refractory we will consider a cortisone injection. Pt agrees to the above plan and all questions were answered.

## 2022-07-12 NOTE — PHYSICAL EXAM
[de-identified] : Physical Exam:\par General: Well appearing, no acute distress\par Neurologic: A&Ox3, No focal deficits\par Head: NCAT without abrasions, lacerations, or ecchymosis to head, face, or scalp\par Eyes: No scleral icterus, no gross abnormalities\par Respiratory: Equal chest wall expansion bilaterally, no accessory muscle use\par Lymphatic: No lymphadenopathy palpated\par Skin: Warm and dry\par Psychiatric: Normal mood and affect \par \par R Shoulder\par \par No tenderness to palpation over R clavicle, AC joint, upper trap, biceps tendon, biceps belly, deltoid. \par PROM: full and painless shoulder flexion, shoulder ABD, shoulder extension, shoulder ER at 0, IR at 90, shoulder ADD, Right (R) sitting anteriorly. \par AROM: shoulder IR to 40 with pain, full and painless shoulder flexion, shoulder ABD, shoulder extension, shoulder ER at 0, shoulder ADD, shoulder retraction\par MMT: 5/5 shoulder flexion, 4+/5 shoulder ABD, 5/5 shoulder ER at 0, 5/5 shoulder IR at 0, 5/5 shoulder ADD, 5/5 shoulder retraction, 5/5 shoulder protraction\par ·	Tests: Coronel test positive Neer sign positive negative drop arm test secondary to pain, bear hug test positive Napolean sign positive cross arm adduction negative, lift off sign positive, hornblowers sign negative, speeds test negative, Yergason's test negative, no bicipital groove tenderness, Lindsay's Active Compression test mildly positive, negative sheer. \par \par Left Shoulder\par ·	Inspection/Palpation: no tenderness, swelling or deformities\par ·	Range of Motion: full and painless in all planes, no crepitus\par ·	Strength: forward elevation in scapular plane 5/5, internal rotation 5/5, external rotation 5/5, adduction 5/5 and abduction 5/5\par ·	Stability: no joint instability on provocative testing\par ·	Tests: Coronel test negative, Neer sign negative, negative drop arm test secondary to pain, bear hug test negative, Napolean sign negative, cross arm adduction negative, lift off sign positive, hornblowers sign negative, speeds test negative, Yergason's test negative, no bicipital groove tenderness, Lindsay's Active Compression test negative  [de-identified] : 4 views of r shoulder were performed today and available for me to review. Results were discussed with the patient. They demonstrate no f/x, dislocation or other deformity.\par

## 2022-07-20 ENCOUNTER — APPOINTMENT (OUTPATIENT)
Dept: GASTROENTEROLOGY | Facility: AMBULATORY MEDICAL SERVICES | Age: 57
End: 2022-07-20
Payer: COMMERCIAL

## 2022-07-20 PROCEDURE — 45378 DIAGNOSTIC COLONOSCOPY: CPT

## 2022-07-20 PROCEDURE — 43235 EGD DIAGNOSTIC BRUSH WASH: CPT

## 2022-08-30 ENCOUNTER — APPOINTMENT (OUTPATIENT)
Dept: DERMATOLOGY | Facility: CLINIC | Age: 57
End: 2022-08-30

## 2022-08-30 DIAGNOSIS — L60.3 NAIL DYSTROPHY: ICD-10-CM

## 2022-08-30 DIAGNOSIS — B07.9 VIRAL WART, UNSPECIFIED: ICD-10-CM

## 2022-08-30 DIAGNOSIS — L82.1 OTHER SEBORRHEIC KERATOSIS: ICD-10-CM

## 2022-08-30 PROCEDURE — 17110 DESTRUCTION B9 LES UP TO 14: CPT

## 2022-08-30 PROCEDURE — 99213 OFFICE O/P EST LOW 20 MIN: CPT | Mod: 25

## 2022-08-30 NOTE — HISTORY OF PRESENT ILLNESS
[de-identified] : f/u for check of hands; \par thumb nail did well; s/p damage from gel manicures;  now growing normally\par c/o recurrent wart on R pinky

## 2022-08-30 NOTE — PHYSICAL EXAM
[FreeTextEntry3] : keratotic papule with black dots on surface;\par R pinky tip\par \par Scaling waxy stuck on macule;  L proximal forearm

## 2022-08-30 NOTE — ASSESSMENT
[FreeTextEntry1] : Nail dystrophy;  appears improved; \par \par Therapeutic options and their risks and benefits; along with multiple diagnostic possibilities were discussed at length; risks and benefits of further study were discussed;\par \par Wart R ally;  cryo;  f/u prn; \par \par Benign SK L forearm\par \par skin check;  winter;

## 2022-09-16 LAB
T4 FREE SERPL-MCNC: 1 NG/DL
T4BG SERPL-MCNC: 24 UG/ML
THYROPEROXIDASE AB SERPL IA-ACNC: 944 IU/ML
TSH SERPL-ACNC: 5.73 UIU/ML

## 2022-09-20 ENCOUNTER — APPOINTMENT (OUTPATIENT)
Dept: INTERNAL MEDICINE | Facility: CLINIC | Age: 57
End: 2022-09-20

## 2022-12-12 ENCOUNTER — TRANSCRIPTION ENCOUNTER (OUTPATIENT)
Age: 57
End: 2022-12-12

## 2023-01-26 ENCOUNTER — APPOINTMENT (OUTPATIENT)
Dept: INTERNAL MEDICINE | Facility: CLINIC | Age: 58
End: 2023-01-26

## 2023-01-27 ENCOUNTER — RX RENEWAL (OUTPATIENT)
Age: 58
End: 2023-01-27

## 2023-04-24 ENCOUNTER — RX RENEWAL (OUTPATIENT)
Age: 58
End: 2023-04-24

## 2023-04-24 ENCOUNTER — TRANSCRIPTION ENCOUNTER (OUTPATIENT)
Age: 58
End: 2023-04-24

## 2023-06-14 ENCOUNTER — RESULT CHARGE (OUTPATIENT)
Age: 58
End: 2023-06-14

## 2023-06-14 ENCOUNTER — APPOINTMENT (OUTPATIENT)
Dept: OBGYN | Facility: CLINIC | Age: 58
End: 2023-06-14
Payer: COMMERCIAL

## 2023-06-14 VITALS
BODY MASS INDEX: 22.02 KG/M2 | HEIGHT: 66 IN | DIASTOLIC BLOOD PRESSURE: 84 MMHG | WEIGHT: 137 LBS | SYSTOLIC BLOOD PRESSURE: 160 MMHG | HEART RATE: 80 BPM

## 2023-06-14 LAB
BILIRUB UR QL STRIP: NORMAL
CLARITY UR: NORMAL
COLLECTION METHOD: NORMAL
DATE COLLECTED: NORMAL
GLUCOSE UR-MCNC: NORMAL
HCG UR QL: 0.2 EU/DL
HEMOCCULT SP1 STL QL: NEGATIVE
HEMOGLOBIN: 13.8
HGB UR QL STRIP.AUTO: NORMAL
KETONES UR-MCNC: NORMAL
LEUKOCYTE ESTERASE UR QL STRIP: NORMAL
NITRITE UR QL STRIP: NORMAL
PH UR STRIP: 6.5
PROT UR STRIP-MCNC: NORMAL
QUALITY CONTROL: YES
SP GR UR STRIP: 1

## 2023-06-14 PROCEDURE — 99396 PREV VISIT EST AGE 40-64: CPT

## 2023-06-14 PROCEDURE — 81003 URINALYSIS AUTO W/O SCOPE: CPT | Mod: QW

## 2023-06-14 PROCEDURE — 82270 OCCULT BLOOD FECES: CPT

## 2023-06-14 PROCEDURE — 85018 HEMOGLOBIN: CPT | Mod: QW

## 2023-06-15 ENCOUNTER — APPOINTMENT (OUTPATIENT)
Dept: INTERNAL MEDICINE | Facility: CLINIC | Age: 58
End: 2023-06-15
Payer: COMMERCIAL

## 2023-06-15 VITALS
DIASTOLIC BLOOD PRESSURE: 78 MMHG | TEMPERATURE: 98.2 F | HEIGHT: 66 IN | OXYGEN SATURATION: 98 % | HEART RATE: 70 BPM | BODY MASS INDEX: 22.02 KG/M2 | WEIGHT: 137 LBS | SYSTOLIC BLOOD PRESSURE: 138 MMHG

## 2023-06-15 DIAGNOSIS — E03.9 HYPOTHYROIDISM, UNSPECIFIED: ICD-10-CM

## 2023-06-15 DIAGNOSIS — I10 ESSENTIAL (PRIMARY) HYPERTENSION: ICD-10-CM

## 2023-06-15 DIAGNOSIS — Z00.00 ENCOUNTER FOR GENERAL ADULT MEDICAL EXAMINATION W/OUT ABNORMAL FINDINGS: ICD-10-CM

## 2023-06-15 LAB — TSH SERPL-ACNC: 3.85 UIU/ML

## 2023-06-15 PROCEDURE — 99396 PREV VISIT EST AGE 40-64: CPT

## 2023-06-15 RX ORDER — MELOXICAM 15 MG/1
15 TABLET ORAL
Qty: 21 | Refills: 0 | Status: DISCONTINUED | COMMUNITY
Start: 2022-07-12 | End: 2023-06-15

## 2023-06-15 NOTE — PLAN
[FreeTextEntry1] : \par \par Patient to follow up in 1 year for annual GYN exam\par Mammogram and bilateral breast US due: now; Rx given\par Colonoscopy due: 07/2027 per [facility]\par Bone density due: now; Rx given\par Pap ordered\par \par Hemoccult ordered\par All questions answered, patient is agreeable with plan.\par PMB precautions reviewed\par vaginal lubricants PRN\par \par I Mery PEREIRA am scribing for the presence of Dr. Stein the following sections HISTORY OF PRESENT ILLNESS, PAST MEDICAL/FAMILY/SOCIAL HISTORY; REVIEW OF SYSTEMS; VITAL SIGNS; PHYSICAL EXAM; DISPOSITION. \par \par \par I personally performed the services described in the documentation, reviewed the documentation recorded by the scribe in my presence and it accurately and completely records my words and actions.\par \par

## 2023-06-15 NOTE — ASSESSMENT
[FreeTextEntry1] : 1.Hypothyroidism: recheck TFT's, Continue levothyroxine 25 mcg 1.5 tablets daily. \par \par 2.HM: Discussed blood work to obtain.\par Patient counseled regarding recommendations for vaccines, diet and exercise and all preventative screening.\par f/u with gyn for HRT, mammogram, BMD, pap smear. UTD with colonoscopy.\par \par 3.Dysphagia: f/u with GI to reschedule EGD as canceled due to loose tooth.

## 2023-06-15 NOTE — HISTORY OF PRESENT ILLNESS
[TextBox_4] : 57 year old female presents for her annual visit. Denies GYN complaints at this time.\par \par

## 2023-06-15 NOTE — HISTORY OF PRESENT ILLNESS
[FreeTextEntry1] : CPE [de-identified] : DINA HOUSER is a 57 year F who comes in for an annual physical exam.\par Pt did increase dose of her thyroid med to 1.5 tabs daily and is feeling much better.\par BW results not reflecting date of blood draw, due for bw today. \par She notes having on/off hot flashes, did see Gyn yest for annual. \par Patient denies any cp, sob,abdominal pain, nausea, vomiting, palpitations, fever, chills, constipation, diarrhea.\par

## 2023-06-15 NOTE — HEALTH RISK ASSESSMENT
[Yes] : Yes [Monthly or less (1 pt)] : Monthly or less (1 point) [1 or 2 (0 pts)] : 1 or 2 (0 points) [Less than monthly (1 pt)] : Less than monthly (1 point) [No] : In the past 12 months have you used drugs other than those required for medical reasons? No [0] : 2) Feeling down, depressed, or hopeless: Not at all (0) [Patient reported mammogram was normal] : Patient reported mammogram was normal [Patient reported bone density results were normal] : Patient reported bone density results were normal [Patient reported colonoscopy was normal] : Patient reported colonoscopy was normal [Never] : Never [PHQ-2 Negative - No further assessment needed] : PHQ-2 Negative - No further assessment needed [[id number]] : 0 [EyeExamDate] : 01/2021 [Patient reported PAP Smear was normal] : Patient reported PAP Smear was normal [MammogramDate] : 01/2022 [PapSmearDate] : 06/14/23 [BoneDensityDate] : 01/2016 [ColonoscopyDate] : 07/2022 [ColonoscopyComments] : , repeat in 5 yrs

## 2023-06-15 NOTE — PHYSICAL EXAM
[Chaperone Present] : A chaperone was present in the examining room during all aspects of the physical examination [Appropriately responsive] : appropriately responsive [Alert] : alert [No Acute Distress] : no acute distress [No Lymphadenopathy] : no lymphadenopathy [Soft] : soft [Non-tender] : non-tender [Non-distended] : non-distended [No HSM] : No HSM [No Lesions] : no lesions [No Mass] : no mass [Oriented x3] : oriented x3 [Examination Of The Breasts] : a normal appearance [No Masses] : no breast masses were palpable [Labia Majora] : normal [Labia Minora] : normal [Normal] : normal [Uterine Adnexae] : normal [Normal rectal exam] : was normal [FreeTextEntry1] : JENNIFER StoutC [Occult Blood Positive] : was negative for occult blood analysis [FreeTextEntry4] : ba. -1; c. -4

## 2023-06-19 LAB — CYTOLOGY CVX/VAG DOC THIN PREP: NORMAL

## 2023-06-27 ENCOUNTER — APPOINTMENT (OUTPATIENT)
Dept: MAMMOGRAPHY | Facility: CLINIC | Age: 58
End: 2023-06-27
Payer: COMMERCIAL

## 2023-06-27 ENCOUNTER — RESULT REVIEW (OUTPATIENT)
Age: 58
End: 2023-06-27

## 2023-06-27 ENCOUNTER — APPOINTMENT (OUTPATIENT)
Dept: RADIOLOGY | Facility: CLINIC | Age: 58
End: 2023-06-27
Payer: COMMERCIAL

## 2023-06-27 ENCOUNTER — APPOINTMENT (OUTPATIENT)
Dept: ULTRASOUND IMAGING | Facility: CLINIC | Age: 58
End: 2023-06-27
Payer: COMMERCIAL

## 2023-06-27 ENCOUNTER — OUTPATIENT (OUTPATIENT)
Dept: OUTPATIENT SERVICES | Facility: HOSPITAL | Age: 58
LOS: 1 days | End: 2023-06-27
Payer: COMMERCIAL

## 2023-06-27 DIAGNOSIS — Z00.00 ENCOUNTER FOR GENERAL ADULT MEDICAL EXAMINATION WITHOUT ABNORMAL FINDINGS: ICD-10-CM

## 2023-06-27 PROCEDURE — 77063 BREAST TOMOSYNTHESIS BI: CPT | Mod: 26

## 2023-06-27 PROCEDURE — 77085 DXA BONE DENSITY AXL VRT FX: CPT | Mod: 26

## 2023-06-27 PROCEDURE — 76641 ULTRASOUND BREAST COMPLETE: CPT

## 2023-06-27 PROCEDURE — 77067 SCR MAMMO BI INCL CAD: CPT

## 2023-06-27 PROCEDURE — 77067 SCR MAMMO BI INCL CAD: CPT | Mod: 26

## 2023-06-27 PROCEDURE — 76641 ULTRASOUND BREAST COMPLETE: CPT | Mod: 26,50

## 2023-06-27 PROCEDURE — 77085 DXA BONE DENSITY AXL VRT FX: CPT

## 2023-06-27 PROCEDURE — 77063 BREAST TOMOSYNTHESIS BI: CPT

## 2023-06-29 ENCOUNTER — NON-APPOINTMENT (OUTPATIENT)
Age: 58
End: 2023-06-29

## 2023-07-21 ENCOUNTER — RX RENEWAL (OUTPATIENT)
Age: 58
End: 2023-07-21

## 2023-07-24 ENCOUNTER — TRANSCRIPTION ENCOUNTER (OUTPATIENT)
Age: 58
End: 2023-07-24

## 2023-07-31 LAB
ALBUMIN SERPL ELPH-MCNC: 4.8 G/DL
ALP BLD-CCNC: 78 U/L
ALT SERPL-CCNC: 15 U/L
ANION GAP SERPL CALC-SCNC: 9 MMOL/L
AST SERPL-CCNC: 19 U/L
BILIRUB SERPL-MCNC: 0.6 MG/DL
BUN SERPL-MCNC: 12 MG/DL
CALCIUM SERPL-MCNC: 9.4 MG/DL
CHLORIDE SERPL-SCNC: 103 MMOL/L
CHOLEST SERPL-MCNC: 208 MG/DL
CO2 SERPL-SCNC: 26 MMOL/L
CREAT SERPL-MCNC: 0.73 MG/DL
EGFR: 96 ML/MIN/1.73M2
GLUCOSE SERPL-MCNC: 86 MG/DL
HDLC SERPL-MCNC: 58 MG/DL
LDLC SERPL CALC-MCNC: 135 MG/DL
NONHDLC SERPL-MCNC: 150 MG/DL
POTASSIUM SERPL-SCNC: 4.1 MMOL/L
PROT SERPL-MCNC: 6.9 G/DL
SODIUM SERPL-SCNC: 138 MMOL/L
T4 FREE SERPL-MCNC: 1.1 NG/DL
TRIGL SERPL-MCNC: 85 MG/DL
TSH SERPL-ACNC: 2.67 UIU/ML

## 2023-08-01 LAB
ESTIMATED AVERAGE GLUCOSE: 114 MG/DL
HBA1C MFR BLD HPLC: 5.6 %

## 2023-08-02 ENCOUNTER — NON-APPOINTMENT (OUTPATIENT)
Age: 58
End: 2023-08-02

## 2024-01-17 ENCOUNTER — RX RENEWAL (OUTPATIENT)
Age: 59
End: 2024-01-17

## 2024-01-23 ENCOUNTER — TRANSCRIPTION ENCOUNTER (OUTPATIENT)
Age: 59
End: 2024-01-23

## 2024-04-16 LAB
T4 SERPL-MCNC: 7.2 UG/DL
TSH SERPL-ACNC: 4.91 UIU/ML

## 2024-06-02 LAB
T4 SERPL-MCNC: 6.7 UG/DL
TSH SERPL-ACNC: 4.28 UIU/ML

## 2024-06-03 ENCOUNTER — TRANSCRIPTION ENCOUNTER (OUTPATIENT)
Age: 59
End: 2024-06-03

## 2024-06-03 ENCOUNTER — OFFICE (OUTPATIENT)
Dept: URBAN - METROPOLITAN AREA CLINIC 12 | Facility: CLINIC | Age: 59
Setting detail: OPHTHALMOLOGY
End: 2024-06-03
Payer: COMMERCIAL

## 2024-06-03 DIAGNOSIS — H40.013: ICD-10-CM

## 2024-06-03 PROCEDURE — 92133 CPTRZD OPH DX IMG PST SGM ON: CPT | Performed by: OPHTHALMOLOGY

## 2024-06-03 PROCEDURE — 92014 COMPRE OPH EXAM EST PT 1/>: CPT | Performed by: OPHTHALMOLOGY

## 2024-06-03 PROCEDURE — 92083 EXTENDED VISUAL FIELD XM: CPT | Performed by: OPHTHALMOLOGY

## 2024-06-03 RX ORDER — LEVOTHYROXINE SODIUM 0.03 MG/1
25 TABLET ORAL
Qty: 135 | Refills: 1 | Status: ACTIVE | COMMUNITY
Start: 2021-02-25 | End: 1900-01-01

## 2024-06-03 ASSESSMENT — CONFRONTATIONAL VISUAL FIELD TEST (CVF)
OD_FINDINGS: FULL
OS_FINDINGS: FULL

## 2024-06-04 ENCOUNTER — NON-APPOINTMENT (OUTPATIENT)
Age: 59
End: 2024-06-04

## 2024-06-25 ENCOUNTER — APPOINTMENT (OUTPATIENT)
Dept: GASTROENTEROLOGY | Facility: CLINIC | Age: 59
End: 2024-06-25
Payer: MEDICAID

## 2024-06-25 VITALS
WEIGHT: 138 LBS | BODY MASS INDEX: 22.18 KG/M2 | DIASTOLIC BLOOD PRESSURE: 80 MMHG | HEIGHT: 66 IN | SYSTOLIC BLOOD PRESSURE: 142 MMHG

## 2024-06-25 DIAGNOSIS — R13.10 DYSPHAGIA, UNSPECIFIED: ICD-10-CM

## 2024-06-25 DIAGNOSIS — Z71.9 COUNSELING, UNSPECIFIED: ICD-10-CM

## 2024-06-25 LAB
25(OH)D3 SERPL-MCNC: 32.3 NG/ML
ALBUMIN SERPL ELPH-MCNC: 4.7 G/DL
ALP BLD-CCNC: 83 U/L
ALT SERPL-CCNC: 18 U/L
ANION GAP SERPL CALC-SCNC: 14 MMOL/L
APPEARANCE: CLEAR
AST SERPL-CCNC: 22 U/L
BACTERIA: NEGATIVE /HPF
BASOPHILS # BLD AUTO: 0.03 K/UL
BASOPHILS NFR BLD AUTO: 0.6 %
BILIRUB SERPL-MCNC: 0.5 MG/DL
BILIRUBIN URINE: NEGATIVE
BLOOD URINE: NEGATIVE
BUN SERPL-MCNC: 11 MG/DL
CALCIUM SERPL-MCNC: 9.4 MG/DL
CAST: 0 /LPF
CHLORIDE SERPL-SCNC: 105 MMOL/L
CHOLEST SERPL-MCNC: 229 MG/DL
CO2 SERPL-SCNC: 24 MMOL/L
COLOR: YELLOW
CREAT SERPL-MCNC: 0.69 MG/DL
EGFR: 101 ML/MIN/1.73M2
EOSINOPHIL # BLD AUTO: 0.18 K/UL
EOSINOPHIL NFR BLD AUTO: 3.5 %
EPITHELIAL CELLS: 0 /HPF
GLUCOSE QUALITATIVE U: NEGATIVE MG/DL
GLUCOSE SERPL-MCNC: 81 MG/DL
HCT VFR BLD CALC: 42 %
HDLC SERPL-MCNC: 58 MG/DL
HGB BLD-MCNC: 13.6 G/DL
IMM GRANULOCYTES NFR BLD AUTO: 0.2 %
KETONES URINE: NEGATIVE MG/DL
LDLC SERPL CALC-MCNC: 154 MG/DL
LEUKOCYTE ESTERASE URINE: ABNORMAL
LYMPHOCYTES # BLD AUTO: 1.66 K/UL
LYMPHOCYTES NFR BLD AUTO: 31.9 %
MAN DIFF?: NORMAL
MCHC RBC-ENTMCNC: 28.2 PG
MCHC RBC-ENTMCNC: 32.4 GM/DL
MCV RBC AUTO: 87 FL
MICROSCOPIC-UA: NORMAL
MONOCYTES # BLD AUTO: 0.34 K/UL
MONOCYTES NFR BLD AUTO: 6.5 %
NEUTROPHILS # BLD AUTO: 2.98 K/UL
NEUTROPHILS NFR BLD AUTO: 57.3 %
NITRITE URINE: NEGATIVE
NONHDLC SERPL-MCNC: 171 MG/DL
PH URINE: 7
PLATELET # BLD AUTO: 187 K/UL
POTASSIUM SERPL-SCNC: 4.5 MMOL/L
PROT SERPL-MCNC: 7.4 G/DL
PROTEIN URINE: NEGATIVE MG/DL
RBC # BLD: 4.83 M/UL
RBC # FLD: 14.1 %
RED BLOOD CELLS URINE: 0 /HPF
SODIUM SERPL-SCNC: 143 MMOL/L
SPECIFIC GRAVITY URINE: <1.005
TRIGL SERPL-MCNC: 99 MG/DL
TSH SERPL-ACNC: 3.84 UIU/ML
UROBILINOGEN URINE: 0.2 MG/DL
WBC # FLD AUTO: 5.2 K/UL
WHITE BLOOD CELLS URINE: 0 /HPF

## 2024-06-25 PROCEDURE — 99213 OFFICE O/P EST LOW 20 MIN: CPT

## 2024-06-25 RX ORDER — KETOCONAZOLE 20 MG/G
2 CREAM TOPICAL TWICE DAILY
Qty: 1 | Refills: 3 | Status: DISCONTINUED | COMMUNITY
Start: 2022-01-04 | End: 2024-06-25

## 2024-06-27 ENCOUNTER — APPOINTMENT (OUTPATIENT)
Dept: INTERNAL MEDICINE | Facility: CLINIC | Age: 59
End: 2024-06-27

## 2024-07-17 ENCOUNTER — NON-APPOINTMENT (OUTPATIENT)
Age: 59
End: 2024-07-17

## 2024-09-25 ENCOUNTER — OUTPATIENT (OUTPATIENT)
Dept: OUTPATIENT SERVICES | Facility: HOSPITAL | Age: 59
LOS: 1 days | End: 2024-09-25
Payer: MEDICAID

## 2024-09-25 ENCOUNTER — APPOINTMENT (OUTPATIENT)
Dept: ULTRASOUND IMAGING | Facility: CLINIC | Age: 59
End: 2024-09-25
Payer: MEDICAID

## 2024-09-25 ENCOUNTER — RESULT REVIEW (OUTPATIENT)
Age: 59
End: 2024-09-25

## 2024-09-25 ENCOUNTER — APPOINTMENT (OUTPATIENT)
Dept: MAMMOGRAPHY | Facility: CLINIC | Age: 59
End: 2024-09-25
Payer: MEDICAID

## 2024-09-25 DIAGNOSIS — R92.30 DENSE BREASTS, UNSPECIFIED: ICD-10-CM

## 2024-09-25 DIAGNOSIS — Z00.8 ENCOUNTER FOR OTHER GENERAL EXAMINATION: ICD-10-CM

## 2024-09-25 DIAGNOSIS — Z12.39 ENCOUNTER FOR OTHER SCREENING FOR MALIGNANT NEOPLASM OF BREAST: ICD-10-CM

## 2024-09-25 PROCEDURE — 76641 ULTRASOUND BREAST COMPLETE: CPT | Mod: 26,50

## 2024-09-25 PROCEDURE — 77067 SCR MAMMO BI INCL CAD: CPT | Mod: 26

## 2024-09-25 PROCEDURE — 77063 BREAST TOMOSYNTHESIS BI: CPT | Mod: 26

## 2024-09-25 PROCEDURE — 77067 SCR MAMMO BI INCL CAD: CPT

## 2024-09-25 PROCEDURE — 76641 ULTRASOUND BREAST COMPLETE: CPT

## 2024-09-25 PROCEDURE — 77063 BREAST TOMOSYNTHESIS BI: CPT

## 2024-11-20 ENCOUNTER — RX RENEWAL (OUTPATIENT)
Age: 59
End: 2024-11-20

## 2024-12-09 ENCOUNTER — APPOINTMENT (OUTPATIENT)
Dept: INTERNAL MEDICINE | Facility: CLINIC | Age: 59
End: 2024-12-09
Payer: MEDICAID

## 2024-12-09 ENCOUNTER — RESULT REVIEW (OUTPATIENT)
Age: 59
End: 2024-12-09

## 2024-12-09 VITALS
HEIGHT: 66 IN | DIASTOLIC BLOOD PRESSURE: 80 MMHG | SYSTOLIC BLOOD PRESSURE: 138 MMHG | HEART RATE: 62 BPM | WEIGHT: 137 LBS | TEMPERATURE: 97.3 F | BODY MASS INDEX: 22.02 KG/M2 | OXYGEN SATURATION: 99 %

## 2024-12-09 DIAGNOSIS — R59.9 ENLARGED LYMPH NODES, UNSPECIFIED: ICD-10-CM

## 2024-12-09 DIAGNOSIS — E78.5 HYPERLIPIDEMIA, UNSPECIFIED: ICD-10-CM

## 2024-12-09 DIAGNOSIS — E03.9 HYPOTHYROIDISM, UNSPECIFIED: ICD-10-CM

## 2024-12-09 DIAGNOSIS — R03.0 ELEVATED BLOOD-PRESSURE READING, W/OUT DIAGNOSIS OF HYPERTENSION: ICD-10-CM

## 2024-12-09 PROCEDURE — 99214 OFFICE O/P EST MOD 30 MIN: CPT | Mod: 25

## 2024-12-10 ENCOUNTER — OUTPATIENT (OUTPATIENT)
Dept: OUTPATIENT SERVICES | Facility: HOSPITAL | Age: 59
LOS: 1 days | End: 2024-12-10
Payer: MEDICAID

## 2024-12-10 ENCOUNTER — APPOINTMENT (OUTPATIENT)
Dept: ULTRASOUND IMAGING | Facility: CLINIC | Age: 59
End: 2024-12-10
Payer: MEDICAID

## 2024-12-10 DIAGNOSIS — Z00.8 ENCOUNTER FOR OTHER GENERAL EXAMINATION: ICD-10-CM

## 2024-12-10 DIAGNOSIS — R59.9 ENLARGED LYMPH NODES, UNSPECIFIED: ICD-10-CM

## 2024-12-10 LAB
CHOLEST SERPL-MCNC: 226 MG/DL
HDLC SERPL-MCNC: 54 MG/DL
LDLC SERPL CALC-MCNC: 133 MG/DL
NONHDLC SERPL-MCNC: 172 MG/DL
TRIGL SERPL-MCNC: 224 MG/DL
TSH SERPL-ACNC: 3.52 UIU/ML

## 2024-12-10 PROCEDURE — 76536 US EXAM OF HEAD AND NECK: CPT | Mod: 26

## 2024-12-10 PROCEDURE — 76536 US EXAM OF HEAD AND NECK: CPT

## 2024-12-17 ENCOUNTER — APPOINTMENT (OUTPATIENT)
Dept: GASTROENTEROLOGY | Facility: AMBULATORY MEDICAL SERVICES | Age: 59
End: 2024-12-17
Payer: MEDICAID

## 2024-12-17 ENCOUNTER — RESULT REVIEW (OUTPATIENT)
Age: 59
End: 2024-12-17

## 2024-12-17 PROCEDURE — 43239 EGD BIOPSY SINGLE/MULTIPLE: CPT

## 2024-12-26 ENCOUNTER — RX RENEWAL (OUTPATIENT)
Age: 59
End: 2024-12-26

## 2025-01-06 ENCOUNTER — OFFICE (OUTPATIENT)
Dept: URBAN - METROPOLITAN AREA CLINIC 12 | Facility: CLINIC | Age: 60
Setting detail: OPHTHALMOLOGY
End: 2025-01-06
Payer: COMMERCIAL

## 2025-01-06 DIAGNOSIS — H25.13: ICD-10-CM

## 2025-01-06 DIAGNOSIS — H16.223: ICD-10-CM

## 2025-01-06 DIAGNOSIS — D31.30: ICD-10-CM

## 2025-01-06 DIAGNOSIS — H40.013: ICD-10-CM

## 2025-01-06 PROCEDURE — 92014 COMPRE OPH EXAM EST PT 1/>: CPT | Performed by: OPHTHALMOLOGY

## 2025-01-06 PROCEDURE — 92250 FUNDUS PHOTOGRAPHY W/I&R: CPT | Performed by: OPHTHALMOLOGY

## 2025-01-06 ASSESSMENT — KERATOMETRY
OD_K2POWER_DIOPTERS: 44.50
OD_AXISANGLE_DEGREES: 085
OS_K1POWER_DIOPTERS: 44.50
OS_AXISANGLE_DEGREES: 109
OS_K2POWER_DIOPTERS: 45.25
OD_K1POWER_DIOPTERS: 44.00

## 2025-01-06 ASSESSMENT — REFRACTION_CURRENTRX
OS_OVR_VA: 20/
OD_VPRISM_DIRECTION: SV
OS_AXIS: 180
OS_OVR_VA: 20/
OS_SPHERE: +2.50
OS_CYLINDER: -0.50
OS_CYLINDER: SPHERE
OD_OVR_VA: 20/
OD_OVR_VA: 20/
OD_AXIS: 0
OD_SPHERE: +1.00
OS_VPRISM_DIRECTION: SV
OS_CYLINDER: -0.50
OD_SPHERE: +3.00
OD_VPRISM_DIRECTION: SV
OS_SPHERE: +1.00
OD_SPHERE: +2.50
OS_AXIS: 002
OD_VPRISM_DIRECTION: SV
OS_SPHERE: +3.00
OD_AXIS: 180
OD_AXIS: 0
OD_CYLINDER: SPHERE
OS_OVR_VA: 20/
OS_AXIS: 001
OD_CYLINDER: SPHERE
OD_OVR_VA: 20/
OD_CYLINDER: SPHERE

## 2025-01-06 ASSESSMENT — VISUAL ACUITY
OS_BCVA: 20/30-2
OD_BCVA: 20/25-2

## 2025-01-06 ASSESSMENT — CONFRONTATIONAL VISUAL FIELD TEST (CVF)
OD_FINDINGS: FULL
OS_FINDINGS: FULL

## 2025-01-06 ASSESSMENT — REFRACTION_AUTOREFRACTION
OS_CYLINDER: -0.50
OD_CYLINDER: -0.25
OS_AXIS: 037
OD_SPHERE: +1.75
OS_SPHERE: +1.50
OD_AXIS: 070

## 2025-01-06 ASSESSMENT — TONOMETRY
OS_IOP_MMHG: 19
OD_IOP_MMHG: 19

## 2025-01-06 ASSESSMENT — REFRACTION_MANIFEST
OD_CYLINDER: -0.25
OS_SPHERE: +1.50
OS_AXIS: 035
OS_VA1: 20/20-1
OD_SPHERE: +1.75
OS_ADD: +2.00
OD_AXIS: 070
OS_CYLINDER: -0.50
OD_ADD: +2.00
OD_VA1: 20/20

## 2025-01-06 ASSESSMENT — PACHYMETRY
OD_CT_UM: 555
OS_CT_CORRECTION: 1
OS_CT_UM: 538
OD_CT_CORRECTION: -1

## 2025-01-06 ASSESSMENT — DECREASING TEAR LAKE - SEVERITY SCORE
OD_DEC_TEARLAKE: 1+
OS_DEC_TEARLAKE: 1+

## 2025-02-13 ENCOUNTER — APPOINTMENT (OUTPATIENT)
Dept: INTERNAL MEDICINE | Facility: CLINIC | Age: 60
End: 2025-02-13
Payer: COMMERCIAL

## 2025-02-13 ENCOUNTER — NON-APPOINTMENT (OUTPATIENT)
Age: 60
End: 2025-02-13

## 2025-02-13 VITALS
OXYGEN SATURATION: 99 % | TEMPERATURE: 97.9 F | SYSTOLIC BLOOD PRESSURE: 132 MMHG | HEART RATE: 76 BPM | DIASTOLIC BLOOD PRESSURE: 80 MMHG | HEIGHT: 66 IN | WEIGHT: 137 LBS | BODY MASS INDEX: 22.02 KG/M2

## 2025-02-13 DIAGNOSIS — I10 ESSENTIAL (PRIMARY) HYPERTENSION: ICD-10-CM

## 2025-02-13 DIAGNOSIS — R59.9 ENLARGED LYMPH NODES, UNSPECIFIED: ICD-10-CM

## 2025-02-13 DIAGNOSIS — Z00.00 ENCOUNTER FOR GENERAL ADULT MEDICAL EXAMINATION W/OUT ABNORMAL FINDINGS: ICD-10-CM

## 2025-02-13 DIAGNOSIS — C44.619 BASAL CELL CARCINOMA OF SKIN OF LEFT UPPER LIMB, INCLUDING SHOULDER: ICD-10-CM

## 2025-02-13 DIAGNOSIS — E03.9 HYPOTHYROIDISM, UNSPECIFIED: ICD-10-CM

## 2025-02-13 DIAGNOSIS — E78.5 HYPERLIPIDEMIA, UNSPECIFIED: ICD-10-CM

## 2025-02-13 PROCEDURE — 99396 PREV VISIT EST AGE 40-64: CPT | Mod: 25

## 2025-02-13 PROCEDURE — 93000 ELECTROCARDIOGRAM COMPLETE: CPT

## 2025-02-18 ENCOUNTER — APPOINTMENT (OUTPATIENT)
Dept: ULTRASOUND IMAGING | Facility: CLINIC | Age: 60
End: 2025-02-18
Payer: COMMERCIAL

## 2025-02-18 ENCOUNTER — OUTPATIENT (OUTPATIENT)
Dept: OUTPATIENT SERVICES | Facility: HOSPITAL | Age: 60
LOS: 1 days | End: 2025-02-18
Payer: COMMERCIAL

## 2025-02-18 DIAGNOSIS — R59.9 ENLARGED LYMPH NODES, UNSPECIFIED: ICD-10-CM

## 2025-02-18 PROCEDURE — 76536 US EXAM OF HEAD AND NECK: CPT | Mod: 26

## 2025-02-18 PROCEDURE — 76536 US EXAM OF HEAD AND NECK: CPT

## 2025-03-04 ENCOUNTER — NON-APPOINTMENT (OUTPATIENT)
Age: 60
End: 2025-03-04

## 2025-03-31 PROBLEM — Z12.4 CERVICAL CANCER SCREENING: Status: ACTIVE | Noted: 2025-03-31

## 2025-03-31 PROBLEM — Z12.11 COLON CANCER SCREENING: Status: ACTIVE | Noted: 2025-03-31

## 2025-04-07 ENCOUNTER — APPOINTMENT (OUTPATIENT)
Dept: OBGYN | Facility: CLINIC | Age: 60
End: 2025-04-07
Payer: COMMERCIAL

## 2025-04-07 ENCOUNTER — RESULT CHARGE (OUTPATIENT)
Age: 60
End: 2025-04-07

## 2025-04-07 VITALS
SYSTOLIC BLOOD PRESSURE: 140 MMHG | BODY MASS INDEX: 22.02 KG/M2 | HEIGHT: 66 IN | HEART RATE: 67 BPM | DIASTOLIC BLOOD PRESSURE: 82 MMHG | WEIGHT: 137 LBS

## 2025-04-07 DIAGNOSIS — Z12.4 ENCOUNTER FOR SCREENING FOR MALIGNANT NEOPLASM OF CERVIX: ICD-10-CM

## 2025-04-07 DIAGNOSIS — Z12.11 ENCOUNTER FOR SCREENING FOR MALIGNANT NEOPLASM OF COLON: ICD-10-CM

## 2025-04-07 DIAGNOSIS — Z01.419 ENCOUNTER FOR GYNECOLOGICAL EXAMINATION (GENERAL) (ROUTINE) W/OUT ABNORMAL FINDINGS: ICD-10-CM

## 2025-04-07 LAB
BILIRUB UR QL STRIP: NEGATIVE
CLARITY UR: CLEAR
COLLECTION METHOD: NORMAL
GLUCOSE UR-MCNC: NEGATIVE
HCG UR QL: 0 EU/DL
HEMOGLOBIN: 13.3
HGB UR QL STRIP.AUTO: NEGATIVE
KETONES UR-MCNC: NEGATIVE
LEUKOCYTE ESTERASE UR QL STRIP: NORMAL
NITRITE UR QL STRIP: NEGATIVE
PH UR STRIP: 7
PROT UR STRIP-MCNC: NEGATIVE
SP GR UR STRIP: 1

## 2025-04-07 PROCEDURE — 81003 URINALYSIS AUTO W/O SCOPE: CPT | Mod: QW

## 2025-04-07 PROCEDURE — 99459 PELVIC EXAMINATION: CPT

## 2025-04-07 PROCEDURE — 99396 PREV VISIT EST AGE 40-64: CPT | Mod: 25

## 2025-04-07 PROCEDURE — 85018 HEMOGLOBIN: CPT | Mod: QW

## 2025-04-07 PROCEDURE — 82270 OCCULT BLOOD FECES: CPT

## 2025-04-10 ENCOUNTER — NON-APPOINTMENT (OUTPATIENT)
Age: 60
End: 2025-04-10

## 2025-04-10 LAB — CYTOLOGY CVX/VAG DOC THIN PREP: ABNORMAL

## 2025-06-09 ENCOUNTER — RX RENEWAL (OUTPATIENT)
Age: 60
End: 2025-06-09

## 2025-09-10 ENCOUNTER — RX RENEWAL (OUTPATIENT)
Age: 60
End: 2025-09-10